# Patient Record
Sex: MALE | Race: WHITE | NOT HISPANIC OR LATINO | Employment: OTHER | RURAL
[De-identification: names, ages, dates, MRNs, and addresses within clinical notes are randomized per-mention and may not be internally consistent; named-entity substitution may affect disease eponyms.]

---

## 2023-09-29 ENCOUNTER — TELEPHONE (OUTPATIENT)
Dept: CARDIOLOGY | Facility: CLINIC | Age: 58
End: 2023-09-29

## 2023-09-29 NOTE — TELEPHONE ENCOUNTER
Caller: Colt Rios    Relationship: Self    Best call back number: [phone]     What was the call regarding: PT HAD A 1YR FOLLOW APPT ON 10/5/23 AND DUE TO UNFORSEEN CIRCUMSTANCES, THEY HAD TO RESCHEDULE. PER PT PREFERENCE, WE GOT THEM SCHEUDLED FOR 12/4/23. JUST WANTED TO MAKE OFFICE AWARE THIS MAKES THEIR 1YR FOLLOW UP A 1YR 2 MO FOLLOW UP

## 2023-12-04 ENCOUNTER — OFFICE VISIT (OUTPATIENT)
Dept: CARDIOLOGY | Facility: CLINIC | Age: 58
End: 2023-12-04
Payer: COMMERCIAL

## 2023-12-04 VITALS
SYSTOLIC BLOOD PRESSURE: 132 MMHG | HEART RATE: 74 BPM | OXYGEN SATURATION: 97 % | WEIGHT: 308 LBS | DIASTOLIC BLOOD PRESSURE: 80 MMHG

## 2023-12-04 DIAGNOSIS — G47.33 OBSTRUCTIVE SLEEP APNEA: Primary | ICD-10-CM

## 2023-12-04 RX ORDER — CLONIDINE HYDROCHLORIDE 0.2 MG/1
TABLET ORAL
COMMUNITY
Start: 2023-10-19

## 2023-12-04 RX ORDER — POLYETHYLENE GLYCOL 3350 17 G/17G
17 POWDER, FOR SOLUTION ORAL DAILY
COMMUNITY

## 2023-12-04 RX ORDER — FUROSEMIDE 20 MG/1
1 TABLET ORAL DAILY
COMMUNITY
Start: 2023-10-19

## 2023-12-04 RX ORDER — HYDRALAZINE HYDROCHLORIDE 100 MG/1
TABLET, FILM COATED ORAL
COMMUNITY
Start: 2023-10-19

## 2023-12-04 RX ORDER — DOXAZOSIN MESYLATE 4 MG/1
1 TABLET ORAL NIGHTLY
COMMUNITY
Start: 2023-10-19

## 2023-12-04 RX ORDER — WHEAT DEXTRIN/ASPARTAME 3 G/6 G
1 POWDER IN PACKET (EA) ORAL
COMMUNITY

## 2023-12-04 RX ORDER — ASPIRIN 81 MG/1
81 TABLET ORAL DAILY
COMMUNITY

## 2023-12-04 RX ORDER — SPIRONOLACTONE 25 MG/1
1 TABLET ORAL DAILY
COMMUNITY
Start: 2023-10-19

## 2023-12-04 RX ORDER — ISOSORBIDE MONONITRATE 120 MG/1
1 TABLET, EXTENDED RELEASE ORAL DAILY
COMMUNITY
Start: 2023-10-19

## 2023-12-04 RX ORDER — LOSARTAN POTASSIUM 100 MG/1
1 TABLET ORAL DAILY
COMMUNITY

## 2023-12-04 RX ORDER — CARVEDILOL 12.5 MG/1
TABLET ORAL
COMMUNITY
Start: 2023-10-19

## 2023-12-07 NOTE — PROGRESS NOTES
[facility] Provider Note     Date:   2023   Name: Colt Rios  :   1965  PCP: Christopher Ballesteros MD    Chief Complaint   Patient presents with    Sleep Apnea       Subjective     History of Present Illness  Colt Rios is a 58 y.o. male who presents today for routine follow-up of sleep apnea.  He reports doing well and wearing his PAP machine faithfully.  He denies any mask leaks or excessive daytime sleepiness.  He has 1 or 2 days where he has not worn his machine.  This is usually related to sinus issues but otherwise he is very faithful about wearing it and gets good benefit out of it.  Plan to continue PAP therapy.    Cardiology and sleep history  1.  Hyperlipidemia -follows with Dr. Goyo Oliva for cardiac issues  2.  Obstructive sleep apnea on PAP therapy    Allergies   Allergen Reactions    Betamethasone Diarrhea     It causes abdominal pain    Amoxicillin-Pot Clavulanate Nausea Only       Current Outpatient Medications:     aspirin 81 MG EC tablet, Take 1 tablet by mouth Daily., Disp: , Rfl:     carvedilol (COREG) 12.5 MG tablet, TAKE ONE (1) TABLET BY MOUTH TWO (2) TIMES A DAY, Disp: , Rfl:     cloNIDine (CATAPRES) 0.2 MG tablet, TAKE 1 TABLET BY MOUTH EACH EVENING, Disp: , Rfl:     doxazosin (CARDURA) 4 MG tablet, Take 1 tablet by mouth Every Night., Disp: , Rfl:     furosemide (LASIX) 20 MG tablet, Take 1 tablet by mouth Daily., Disp: , Rfl:     hydrALAZINE (APRESOLINE) 100 MG tablet, TAKE 1 TABLET BY MOUTH FOUR (4) TIMES DAILY, Disp: , Rfl:     isosorbide mononitrate (IMDUR) 120 MG 24 hr tablet, Take 1 tablet by mouth Daily., Disp: , Rfl:     losartan (COZAAR) 100 MG tablet, Take 1 tablet by mouth Daily., Disp: , Rfl:     methylcellulose, Laxative, (Citrucel) 500 MG tablet tablet, Take  by mouth Daily., Disp: , Rfl:     polyethylene glycol (MIRALAX) 17 g packet, Take 17 g by mouth Daily., Disp: , Rfl:     spironolactone (ALDACTONE) 25 MG tablet, Take 1  tablet by mouth Daily., Disp: , Rfl:     Wheat Dextrin (Benefiber Drink Mix) pack, Take 1 packet by mouth., Disp: , Rfl:     Past Medical History:   Diagnosis Date    Gallstones     HTN (hypertension)     ALBIN (obstructive sleep apnea)     Umbilical hernia       History reviewed. No pertinent surgical history.  Family History   Problem Relation Age of Onset    Hyperlipidemia Mother     Hypertension Mother     Diabetes Father     COPD Father      Social History     Socioeconomic History    Marital status: Single   Tobacco Use    Smoking status: Never     Passive exposure: Never    Smokeless tobacco: Never   Vaping Use    Vaping Use: Never used   Substance and Sexual Activity    Alcohol use: Not Currently    Drug use: Not Currently    Sexual activity: Defer       Objective     Vital Signs:  /80   Pulse 74   Wt (!) 140 kg (308 lb)   SpO2 97%   There is no height or weight on file to calculate BMI.         Physical Exam  Vitals reviewed.   Constitutional:       General: He is not in acute distress.     Appearance: Normal appearance.   HENT:      Head: Normocephalic and atraumatic.      Mouth/Throat:      Mouth: Mucous membranes are moist.   Eyes:      Conjunctiva/sclera: Conjunctivae normal.   Neck:      Vascular: No carotid bruit.   Cardiovascular:      Rate and Rhythm: Normal rate and regular rhythm.      Pulses: Normal pulses.      Heart sounds: Normal heart sounds. No murmur heard.  Pulmonary:      Effort: Pulmonary effort is normal. No respiratory distress.      Breath sounds: Normal breath sounds. No wheezing or rhonchi.   Abdominal:      General: Abdomen is flat.      Palpations: Abdomen is soft.   Musculoskeletal:      Cervical back: Normal range of motion and neck supple.      Right lower leg: No edema.      Left lower leg: No edema.   Skin:     General: Skin is warm and dry.      Coloration: Skin is not jaundiced.   Neurological:      General: No focal deficit present.      Mental Status: He is alert  and oriented to person, place, and time. Mental status is at baseline.      GCS: GCS eye subscore is 4. GCS verbal subscore is 5. GCS motor subscore is 6.      Cranial Nerves: No cranial nerve deficit.      Motor: No weakness.      Gait: Gait normal.   Psychiatric:         Mood and Affect: Mood and affect normal. Mood is not anxious.         Speech: Speech normal.         Behavior: Behavior normal.                     Assessment and Plan     Diagnoses and all orders for this visit:    1. Obstructive sleep apnea (Primary)  Comments:  Sending in Pap therapy refills.  Follow-up 1 year.  Orders:  -     PAP Therapy        Recommendations: Report if any new/changing symptoms immediately      Follow Up  Return in about 11 months (around 11/4/2024) for Next scheduled follow up.    Latoya Samaniego MD   12/04/2023     Please note that this explicitly excludes time spent on other separate billable services such as performing procedures or test interpretation, when applicable.    This note was created using dictation software which occasionally transcribes nonsensical phrases. Please contact the provider if any clarification is needed.

## 2025-04-21 ENCOUNTER — OFFICE VISIT (OUTPATIENT)
Dept: CARDIOLOGY | Facility: CLINIC | Age: 60
End: 2025-04-21
Payer: COMMERCIAL

## 2025-04-21 VITALS
HEIGHT: 69 IN | DIASTOLIC BLOOD PRESSURE: 64 MMHG | SYSTOLIC BLOOD PRESSURE: 124 MMHG | HEART RATE: 97 BPM | WEIGHT: 286 LBS | OXYGEN SATURATION: 97 % | BODY MASS INDEX: 42.36 KG/M2

## 2025-04-21 DIAGNOSIS — G47.33 OSA (OBSTRUCTIVE SLEEP APNEA): Primary | ICD-10-CM

## 2025-04-21 DIAGNOSIS — I35.0 AORTIC STENOSIS, MILD: ICD-10-CM

## 2025-04-21 DIAGNOSIS — R06.02 SHORTNESS OF BREATH: ICD-10-CM

## 2025-04-21 DIAGNOSIS — R94.31 ABNORMAL EKG: ICD-10-CM

## 2025-04-21 PROCEDURE — 99214 OFFICE O/P EST MOD 30 MIN: CPT | Performed by: NURSE PRACTITIONER

## 2025-04-21 PROCEDURE — 93000 ELECTROCARDIOGRAM COMPLETE: CPT | Performed by: NURSE PRACTITIONER

## 2025-04-21 NOTE — ASSESSMENT & PLAN NOTE
Today shows sinus rhythm with occasional supraventricular premature complexes, heart rate 91 bpm and moderate ST depression.  He reports worsening shortness of breath, weakness and fatigue.    - Nuclear stress test to rule out ischemia

## 2025-04-21 NOTE — ASSESSMENT & PLAN NOTE
History of mild aortic stenosis and was previously followed by Dr. Oliva and he would like to switch his cardiac care here.  His last echocardiogram in 2022 showed mild aortic stenosis.    - Update echocardiogram

## 2025-04-21 NOTE — ASSESSMENT & PLAN NOTE
Doing well on PAP therapy with good control and compliance.  Download reviewed and interpreted today.  Compliance is 93%.  AHI is 4.  He denies excessive daytime sleepiness or fatigue    - Continue PAP therapy at current settings  - Do not drive if sleepy

## 2025-04-21 NOTE — PROGRESS NOTES
Follow-Up Sleep Consult     Date:   2025  Name: Colt Rios  :   1965  PCP: Christopher Ballesteros MD    Chief Complaint   Patient presents with   • Sleep Apnea       Subjective     History of Present Illness  Colt Rios is a 59 y.o. male who presents today for follow-up on ALBIN.  Patient is here today for annual ALBIN visit.  He has a history of sleep apnea and is doing well on PAP therapy with good control and compliance.  Download reviewed and interpreted today.  Compliance is 93%, when used >4 hours is 80%.  Average use per night is 6 hours and 47 minutes.  His AHI is 4.0.  He is using a fullface mask and doing well with that.  Airflow is comfortable.  He denies excessive daytime sleepiness or fatigue.  Patient also has a history of mild aortic stenosis and was previously followed by Dr. Oliva in [facility].  He reports that he has not seen him in the last 2 years because Dr. Oliva is no longer in that practice.  He would like to switch his cardiac care here.  His last echocardiogram in  showed mild aortic stenosis.  He reports that he has been experiencing shortness of breath, dizziness and weakness for the last several months.  He denies chest pain, palpitations or lower extremity edema.  He is due for an echocardiogram but would like to have that completed at [facility].  He has been dealing with significant GI issues and weight loss and will likely need possible abdominal surgery in the future.      Cardiology and sleep history  1.  Hyperlipidemia -follows with Dr. Goyo Oliva for cardiac issues  2.  Obstructive sleep apnea on PAP therapy      Current Treatment: Auto CPAP   Current mask used is fullface mask    Device Functioning Well: Yes  Mask Fit Comfortable: Yes  Air Flow Comfortable: Yes  DME Helpful for Supplies: Yes  Sleep is rested: Yes    Device Download:                         The patient's relevant past medical, surgical, family, and social history reviewed  and updated in Epic as appropriate.    Past Medical History:   Diagnosis Date   • Gallstones    • HTN (hypertension)    • ALBIN (obstructive sleep apnea)    • Umbilical hernia      History reviewed. No pertinent surgical history.    Allergies   Allergen Reactions   • Betamethasone Diarrhea     It causes abdominal pain   • Amoxicillin-Pot Clavulanate Nausea Only     Prior to Admission medications    Medication Sig Start Date End Date Taking? Authorizing Provider   aspirin 81 MG EC tablet Take 1 tablet by mouth Daily.   Yes Juni Goodman MD   carvedilol (COREG) 12.5 MG tablet TAKE ONE (1) TABLET BY MOUTH TWO (2) TIMES A DAY 10/19/23  Yes Juni Goodman MD   cloNIDine (CATAPRES) 0.2 MG tablet TAKE 1 TABLET BY MOUTH EACH EVENING 10/19/23  Yes Juni Goodman MD   doxazosin (CARDURA) 4 MG tablet Take 1 tablet by mouth Every Night. 10/19/23  Yes Juni Goodman MD   furosemide (LASIX) 20 MG tablet Take 1 tablet by mouth Daily. 10/19/23  Yes Juni Goodman MD   hydrALAZINE (APRESOLINE) 100 MG tablet TAKE 1 TABLET BY MOUTH FOUR (4) TIMES DAILY 10/19/23  Yes Juni Goodman MD   isosorbide mononitrate (IMDUR) 120 MG 24 hr tablet Take 1 tablet by mouth Daily. 10/19/23  Yes Juni Goodman MD   losartan (COZAAR) 100 MG tablet Take 1 tablet by mouth Daily.   Yes Juni Goodman MD   methylcellulose, Laxative, (Citrucel) 500 MG tablet tablet Take  by mouth Daily.   Yes Juni Goodman MD   polyethylene glycol (MIRALAX) 17 g packet Take 17 g by mouth Daily.   Yes Juni Goodman MD   spironolactone (ALDACTONE) 25 MG tablet Take 1 tablet by mouth Daily. 10/19/23  Yes Juni Goodman MD   Wheat Dextrin (Benefiber Drink Mix) pack Take 1 packet by mouth.   Yes ProviderJuni MD     Family History   Problem Relation Age of Onset   • Hyperlipidemia Mother    • Hypertension Mother    • Diabetes Father    • COPD Father        Objective     Vital Signs:  /64   " Pulse 97   Ht 175.3 cm (69\")   Wt 130 kg (286 lb)   SpO2 97%   BMI 42.23 kg/m²     Class 3 Severe Obesity (BMI >=40). Obesity-related health conditions include the following: obstructive sleep apnea. Obesity is newly identified. BMI is is above average; BMI management plan is completed. We discussed portion control and increasing exercise.        Physical Exam  Vitals reviewed.   Constitutional:       Appearance: Normal appearance.   HENT:      Head: Normocephalic.   Cardiovascular:      Rate and Rhythm: Normal rate and regular rhythm.      Heart sounds: Normal heart sounds.   Pulmonary:      Effort: Pulmonary effort is normal.      Breath sounds: Normal breath sounds.   Musculoskeletal:      Right lower leg: No edema.      Left lower leg: No edema.   Skin:     General: Skin is warm and dry.      Capillary Refill: Capillary refill takes less than 2 seconds.   Neurological:      General: No focal deficit present.      Mental Status: He is alert and oriented to person, place, and time.   Psychiatric:         Mood and Affect: Mood normal.         Behavior: Behavior normal.             PAP download reviewed: PAP download from 3/19/2025 - 4/17/2025 reviewed      ECG 12 Lead    Date/Time: 4/21/2025 3:51 PM  Performed by: Lynn Santoro APRN    Authorized by: Lynn Santoro APRN  Comparison: not compared with previous ECG   Previous ECG: no previous ECG available  Rhythm: sinus rhythm  Ectopy comments: Occasional supraventricular premature complexes  Rate: normal  BPM: 91  Other findings: low voltage  Other findings comments: Moderate ST depression    Clinical impression: abnormal EKG          Assessment and Plan     Diagnoses and all orders for this visit:    1. ALBIN (obstructive sleep apnea) (Primary)  Assessment & Plan:   Doing well on PAP therapy with good control and compliance.  Download reviewed and interpreted today.  Compliance is 93%.  AHI is 4.  He denies excessive daytime sleepiness or " fatigue    - Continue PAP therapy at current settings  - Do not drive if sleepy    Orders:  -     PAP Therapy    2. Aortic stenosis, mild  Assessment & Plan:  History of mild aortic stenosis and was previously followed by Dr. Oliva and he would like to switch his cardiac care here.  His last echocardiogram in 2022 showed mild aortic stenosis.    - Update echocardiogram    Orders:  -     Adult Transthoracic Echo Complete W/ Cont if Necessary Per Protocol; Future    3. Abnormal EKG  Assessment & Plan:  Today shows sinus rhythm with occasional supraventricular premature complexes, heart rate 91 bpm and moderate ST depression.  He reports worsening shortness of breath, weakness and fatigue.    - Nuclear stress test to rule out ischemia    Orders:  -     Stress Test With Myocardial Perfusion One Day; Future    4. Shortness of breath  Assessment & Plan:  Cardiac workup    Orders:  -     Stress Test With Myocardial Perfusion One Day; Future    Other orders  -     ECG 12 Lead        Report if any new/changing symptoms immediately and Sleep risks reviewed (driving, medical, sleep death, sedating agents)         Follow Up  Return in about 4 weeks (around 5/19/2025) for Dr Samaniego- follow up on aortic stenosis and echocardiogram .  Patient was given instructions and counseling regarding his condition or for health maintenance advice. Please see specific information pulled into the AVS if appropriate.

## 2025-05-19 ENCOUNTER — TELEPHONE (OUTPATIENT)
Dept: CARDIOLOGY | Facility: CLINIC | Age: 60
End: 2025-05-19
Payer: COMMERCIAL

## 2025-05-19 NOTE — TELEPHONE ENCOUNTER
"FAX SENT TO VERIFY THAT WE HAVE RECEIVED THE PREOP REQUEST AND WILL ADDRESS AT HIS 6/2/25 APPT.       \"WE HAVE RECIEVED YOUR REQUEST FOR PREOP CLEARANCE FOR TESSA PINEDA 1965. MR PINEDA HAS AN APPOINTMENT ON 6/2/25. WE WILL ADDRESS THE CLEARANCE AFTER REVIEWING HIS ECHO RESULTS.     THANK YOU. \"  "

## 2025-05-19 NOTE — TELEPHONE ENCOUNTER
Pt has EGD/colonoscopy due (won't schedule until they get info from us) w/ [facility] and they are requesting timeline for holding/restarting eliquis.  Fax number [phone].  Request scanned under media.

## 2025-06-02 ENCOUNTER — OFFICE VISIT (OUTPATIENT)
Dept: CARDIOLOGY | Facility: CLINIC | Age: 60
End: 2025-06-02
Payer: COMMERCIAL

## 2025-06-02 VITALS
WEIGHT: 268 LBS | HEART RATE: 75 BPM | BODY MASS INDEX: 39.69 KG/M2 | DIASTOLIC BLOOD PRESSURE: 88 MMHG | HEIGHT: 69 IN | OXYGEN SATURATION: 99 % | SYSTOLIC BLOOD PRESSURE: 130 MMHG

## 2025-06-02 DIAGNOSIS — G47.33 OSA (OBSTRUCTIVE SLEEP APNEA): Primary | ICD-10-CM

## 2025-06-02 DIAGNOSIS — R94.31 ABNORMAL EKG: ICD-10-CM

## 2025-06-02 DIAGNOSIS — Z01.810 PRE-OPERATIVE CARDIOVASCULAR EXAMINATION: ICD-10-CM

## 2025-06-02 DIAGNOSIS — I35.0 AORTIC STENOSIS, MILD: ICD-10-CM

## 2025-06-02 DIAGNOSIS — Z79.01 ANTICOAGULATED ON ELIQUIS: ICD-10-CM

## 2025-06-02 PROCEDURE — 99214 OFFICE O/P EST MOD 30 MIN: CPT | Performed by: INTERNAL MEDICINE

## 2025-06-02 RX ORDER — ERGOCALCIFEROL 1.25 MG/1
1 CAPSULE, LIQUID FILLED ORAL WEEKLY
COMMUNITY
Start: 2025-04-10

## 2025-06-02 RX ORDER — APIXABAN 5 MG/1
5 TABLET, FILM COATED ORAL 2 TIMES DAILY
COMMUNITY
Start: 2025-04-29

## 2025-06-02 RX ORDER — FERROUS SULFATE 325(65) MG
TABLET ORAL
COMMUNITY
Start: 2025-05-27

## 2025-06-02 RX ORDER — LINACLOTIDE 72 UG/1
CAPSULE, GELATIN COATED ORAL
COMMUNITY
Start: 2025-05-15

## 2025-06-02 RX ORDER — HYDRALAZINE HYDROCHLORIDE 25 MG/1
25 TABLET, FILM COATED ORAL 2 TIMES DAILY
COMMUNITY
Start: 2025-05-27

## 2025-06-02 NOTE — LETTER
2025     HIMANSHU English  [address] [facility] [address] [facility] [address]    Patient: Colt Rios   YOB: 1965   Date of Visit: 2025     Dear HIMANSHU English:       Thank you for referring Colt Rios to me for evaluation. Below are the relevant portions of my assessment and plan of care.    If you have questions, please do not hesitate to call me. I look forward to following Colt along with you.         Sincerely,        Latoya Samaniego MD        CC: No Recipients    Latoya Samaniego MD  [phone]  Sign when Signing Visit      [facility] Provider Note     Date:   2025   Name: Colt Rios  :   1965  PCP: Christopher Ballesteros MD    Chief Complaint   Patient presents with   • Cardiac clearance     Pt states he is here for cardiac clearance for colonoscopy and EGD. No chest pain. SOA on exertion. Pt states he was placed on Eliquis about one month or so ago. Also had echo done at [facility]. Pt states he is losing wt and no one knows why.   • Sleep Apnea     Pt states he is here today for follow up ALBIN. He has not been able to use pap device for the past month or so.        Subjective     History of Present Illness  Colt Rios is a 59 y.o. male who presents today for routine follow-up of sleep apnea.   Reports that is going to a new GI Dr at [facility] to see if he can not figure out his GI problem. Can not eat meat or really anything,Pain after. Is weak. Trying to drink protein shakes.Has lost 70 pounds in the last 3 months. Reports that his skin is breaking down even. This has been a life altering event. States that this is killing him  Feet swelling now.  Has not been wearing C-Pap because it was just filling him up with air and hurting. Was able to get in bed and use it the last couple nights.  B/P still running good. Off all meds except hydralazine 25mg daily. Was started on Eliquis twice a day. They  thought he had a blood clot. Just started the second bottle. Was in the hospital and on heparin gtt for 3 days.  Needing cardiac clearance for EGD.  Never did get called for a stress test because he was so weak. States that he is stronger now but not ready to take a stress test.  06/02/2025 Updated    Cardiology and sleep history  1.  Hyperlipidemia -follows with Dr. Goyo Oliva for cardiac issues  2.  Obstructive sleep apnea on PAP therapy  3. Presumed PE but VQ testing indeterminate   4. CKD      Allergies   Allergen Reactions   • Betamethasone Diarrhea     It causes abdominal pain   • Amoxicillin-Pot Clavulanate Nausea Only   • Contrast Dye (Echo Or Unknown Ct/Mr) Other (See Comments)     Abnormal kidney function       Current Outpatient Medications:   •  aspirin 81 MG EC tablet, Take 1 tablet by mouth Daily., Disp: , Rfl:   •  Eliquis 5 MG tablet tablet, Take 1 tablet by mouth 2 (Two) Times a Day., Disp: , Rfl:   •  FeroSul 325 (65 Fe) MG tablet, TAKE ONE (1) TABLET BY MOUTH DAILY WITH BREAKFAST. DO NOT CRUSH, CHEW, OR SPLIT. TAKE WITH FOOD., Disp: , Rfl:   •  hydrALAZINE (APRESOLINE) 25 MG tablet, Take 1 tablet by mouth 2 (Two) Times a Day., Disp: , Rfl:   •  Linzess 72 MCG capsule capsule, TAKE ONE (1) CAPSULE BY MOUTH DAILY. (Patient taking differently: Take 1 capsule by mouth Every Other Day.), Disp: , Rfl:   •  polyethylene glycol (MIRALAX) 17 g packet, Take 17 g by mouth Daily. (Patient taking differently: Take 17 g by mouth Every Other Day.), Disp: , Rfl:   •  vitamin D (ERGOCALCIFEROL) 1.25 MG (59877 UT) capsule capsule, Take 1 tablet by mouth 1 (One) Time Per Week. (Patient taking differently: Take 1 tablet by mouth 1 (One) Time Per Week. Doesn't always remember to take), Disp: , Rfl:     Past Medical History:   Diagnosis Date   • Gallstones    • HTN (hypertension)    • ALBIN (obstructive sleep apnea)    • Umbilical hernia       Past Surgical History:   Procedure Laterality Date   • CHOLECYSTECTOMY    "    Family History   Problem Relation Age of Onset   • Hyperlipidemia Mother    • Hypertension Mother    • Diabetes Father    • COPD Father      Social History     Socioeconomic History   • Marital status: Single   Tobacco Use   • Smoking status: Never     Passive exposure: Never   • Smokeless tobacco: Never   Vaping Use   • Vaping status: Never Used   Substance and Sexual Activity   • Alcohol use: Not Currently   • Drug use: Not Currently   • Sexual activity: Defer       Objective     Vital Signs:  /88 (BP Location: Right arm, Patient Position: Sitting, Cuff Size: Large Adult)   Pulse 75   Ht 175.3 cm (69\")   Wt 122 kg (268 lb)   SpO2 99%   BMI 39.58 kg/m²   Estimated body mass index is 39.58 kg/m² as calculated from the following:    Height as of this encounter: 175.3 cm (69\").    Weight as of this encounter: 122 kg (268 lb).         Physical Exam  Vitals reviewed.   Constitutional:       General: He is not in acute distress.     Appearance: Normal appearance.   HENT:      Head: Normocephalic and atraumatic.      Mouth/Throat:      Mouth: Mucous membranes are moist.   Eyes:      Conjunctiva/sclera: Conjunctivae normal.   Neck:      Vascular: No carotid bruit.   Cardiovascular:      Rate and Rhythm: Normal rate and regular rhythm.      Pulses: Normal pulses.      Heart sounds: Normal heart sounds. No murmur heard.  Pulmonary:      Effort: Pulmonary effort is normal. No respiratory distress.      Breath sounds: Normal breath sounds. No wheezing or rhonchi.   Abdominal:      General: Abdomen is flat.      Palpations: Abdomen is soft.   Musculoskeletal:      Cervical back: Normal range of motion and neck supple.      Right lower leg: No edema.      Left lower leg: No edema.   Skin:     General: Skin is warm and dry.      Coloration: Skin is not jaundiced.   Neurological:      General: No focal deficit present.      Mental Status: He is alert and oriented to person, place, and time. Mental status is at " baseline.      GCS: GCS eye subscore is 4. GCS verbal subscore is 5. GCS motor subscore is 6.      Cranial Nerves: No cranial nerve deficit.      Motor: No weakness.      Gait: Gait normal.   Psychiatric:         Mood and Affect: Mood and affect normal. Mood is not anxious.         Speech: Speech normal.         Behavior: Behavior normal.                     Assessment and Plan     Diagnoses and all orders for this visit:    1. ALBIN (obstructive sleep apnea) (Primary)    2. Aortic stenosis, mild    3. Abnormal EKG    4. Pre-operative cardiovascular examination    5. Anticoagulated on Eliquis      PLAN  -cannot wear pap therapy because of weight change ,wearing when able  -on eliquis because of suspected PE but never confirmed, the plan was to continue for 3mo. Given the weight loss and anemia I have recommended he come off of this 2 days prior to EGD/colonoscopy and would not resume it. At this point benefits seem very low as there was not a confirmed PE and risk is much greater.   -would proceed with EGD/colonoscopy as planned given the urgency and weight loss. No change in EKG from prior EKGS. Also stress testing prior to procedure may delay EGD/colon which seems more urgent and would not want to consider PCI given CKD and contrast risk as well as the risk of starting DAPT with anemia          Follow Up  Return in about 6 months (around 12/2/2025) for Next scheduled follow up.    Latoya Samaniego MD   06/02/2025   Please note that this explicitly excludes time spent on other separate billable services such as performing procedures or test interpretation, when applicable.    This note was created using dictation software which occasionally transcribes nonsensical phrases. Please contact the provider if any clarification is needed.

## 2025-06-02 NOTE — PROGRESS NOTES
[facility] Provider Note     Date:   2025   Name: Colt Rios  :   1965  PCP: Christopher Ballesteros MD    Chief Complaint   Patient presents with    Cardiac clearance     Pt states he is here for cardiac clearance for colonoscopy and EGD. No chest pain. SOA on exertion. Pt states he was placed on Eliquis about one month or so ago. Also had echo done at [facility]. Pt states he is losing wt and no one knows why.    Sleep Apnea     Pt states he is here today for follow up ALBIN. He has not been able to use pap device for the past month or so.        Subjective     History of Present Illness  Colt Rios is a 59 y.o. male who presents today for routine follow-up of sleep apnea.   Reports that is going to a new GI Dr at [facility] to see if he can not figure out his GI problem. Can not eat meat or really anything,Pain after. Is weak. Trying to drink protein shakes.Has lost 70 pounds in the last 3 months. Reports that his skin is breaking down even. This has been a life altering event. States that this is killing him  Feet swelling now.  Has not been wearing C-Pap because it was just filling him up with air and hurting. Was able to get in bed and use it the last couple nights.  B/P still running good. Off all meds except hydralazine 25mg daily. Was started on Eliquis twice a day. They thought he had a blood clot. Just started the second bottle. Was in the hospital and on heparin gtt for 3 days.  Needing cardiac clearance for EGD.  Never did get called for a stress test because he was so weak. States that he is stronger now but not ready to take a stress test.  2025 Updated    Cardiology and sleep history  1.  Hyperlipidemia -follows with Dr. Gooy Oliva for cardiac issues  2.  Obstructive sleep apnea on PAP therapy  3. Presumed PE but VQ testing indeterminate   4. CKD      Allergies   Allergen Reactions    Betamethasone Diarrhea     It causes abdominal pain     "Amoxicillin-Pot Clavulanate Nausea Only    Contrast Dye (Echo Or Unknown Ct/Mr) Other (See Comments)     Abnormal kidney function       Current Outpatient Medications:     aspirin 81 MG EC tablet, Take 1 tablet by mouth Daily., Disp: , Rfl:     Eliquis 5 MG tablet tablet, Take 1 tablet by mouth 2 (Two) Times a Day., Disp: , Rfl:     FeroSul 325 (65 Fe) MG tablet, TAKE ONE (1) TABLET BY MOUTH DAILY WITH BREAKFAST. DO NOT CRUSH, CHEW, OR SPLIT. TAKE WITH FOOD., Disp: , Rfl:     hydrALAZINE (APRESOLINE) 25 MG tablet, Take 1 tablet by mouth 2 (Two) Times a Day., Disp: , Rfl:     Linzess 72 MCG capsule capsule, TAKE ONE (1) CAPSULE BY MOUTH DAILY. (Patient taking differently: Take 1 capsule by mouth Every Other Day.), Disp: , Rfl:     polyethylene glycol (MIRALAX) 17 g packet, Take 17 g by mouth Daily. (Patient taking differently: Take 17 g by mouth Every Other Day.), Disp: , Rfl:     vitamin D (ERGOCALCIFEROL) 1.25 MG (83138 UT) capsule capsule, Take 1 tablet by mouth 1 (One) Time Per Week. (Patient taking differently: Take 1 tablet by mouth 1 (One) Time Per Week. Doesn't always remember to take), Disp: , Rfl:     Past Medical History:   Diagnosis Date    Gallstones     HTN (hypertension)     ALBIN (obstructive sleep apnea)     Umbilical hernia       Past Surgical History:   Procedure Laterality Date    CHOLECYSTECTOMY       Family History   Problem Relation Age of Onset    Hyperlipidemia Mother     Hypertension Mother     Diabetes Father     COPD Father      Social History     Socioeconomic History    Marital status: Single   Tobacco Use    Smoking status: Never     Passive exposure: Never    Smokeless tobacco: Never   Vaping Use    Vaping status: Never Used   Substance and Sexual Activity    Alcohol use: Not Currently    Drug use: Not Currently    Sexual activity: Defer       Objective     Vital Signs:  /88 (BP Location: Right arm, Patient Position: Sitting, Cuff Size: Large Adult)   Pulse 75   Ht 175.3 cm (69\") " "  Wt 122 kg (268 lb)   SpO2 99%   BMI 39.58 kg/m²   Estimated body mass index is 39.58 kg/m² as calculated from the following:    Height as of this encounter: 175.3 cm (69\").    Weight as of this encounter: 122 kg (268 lb).         Physical Exam  Vitals reviewed.   Constitutional:       General: He is not in acute distress.     Appearance: Normal appearance.   HENT:      Head: Normocephalic and atraumatic.      Mouth/Throat:      Mouth: Mucous membranes are moist.   Eyes:      Conjunctiva/sclera: Conjunctivae normal.   Neck:      Vascular: No carotid bruit.   Cardiovascular:      Rate and Rhythm: Normal rate and regular rhythm.      Pulses: Normal pulses.      Heart sounds: Normal heart sounds. No murmur heard.  Pulmonary:      Effort: Pulmonary effort is normal. No respiratory distress.      Breath sounds: Normal breath sounds. No wheezing or rhonchi.   Abdominal:      General: Abdomen is flat.      Palpations: Abdomen is soft.   Musculoskeletal:      Cervical back: Normal range of motion and neck supple.      Right lower leg: No edema.      Left lower leg: No edema.   Skin:     General: Skin is warm and dry.      Coloration: Skin is not jaundiced.   Neurological:      General: No focal deficit present.      Mental Status: He is alert and oriented to person, place, and time. Mental status is at baseline.      GCS: GCS eye subscore is 4. GCS verbal subscore is 5. GCS motor subscore is 6.      Cranial Nerves: No cranial nerve deficit.      Motor: No weakness.      Gait: Gait normal.   Psychiatric:         Mood and Affect: Mood and affect normal. Mood is not anxious.         Speech: Speech normal.         Behavior: Behavior normal.                     Assessment and Plan     Diagnoses and all orders for this visit:    1. ALBIN (obstructive sleep apnea) (Primary)    2. Aortic stenosis, mild    3. Abnormal EKG    4. Pre-operative cardiovascular examination    5. Anticoagulated on Eliquis      PLAN  -cannot wear pap " therapy because of weight change ,wearing when able  -on eliquis because of suspected PE but never confirmed, the plan was to continue for 3mo. Given the weight loss and anemia I have recommended he come off of this 2 days prior to EGD/colonoscopy and would not resume it. At this point benefits seem very low as there was not a confirmed PE and risk is much greater.   -would proceed with EGD/colonoscopy as planned given the urgency and weight loss. No change in EKG from prior EKGS. Also stress testing prior to procedure may delay EGD/colon which seems more urgent and would not want to consider PCI given CKD and contrast risk as well as the risk of starting DAPT with anemia          Follow Up  Return in about 6 months (around 12/2/2025) for Next scheduled follow up.    Latoya Samaniego MD   06/02/2025   Please note that this explicitly excludes time spent on other separate billable services such as performing procedures or test interpretation, when applicable.    This note was created using dictation software which occasionally transcribes nonsensical phrases. Please contact the provider if any clarification is needed.